# Patient Record
Sex: FEMALE | Race: WHITE | Employment: FULL TIME | URBAN - METROPOLITAN AREA
[De-identification: names, ages, dates, MRNs, and addresses within clinical notes are randomized per-mention and may not be internally consistent; named-entity substitution may affect disease eponyms.]

---

## 2021-02-21 ENCOUNTER — HOSPITAL ENCOUNTER (EMERGENCY)
Age: 26
Discharge: HOME | End: 2021-02-22
Attending: EMERGENCY MEDICINE | Admitting: EMERGENCY MEDICINE
Payer: COMMERCIAL

## 2021-02-21 ENCOUNTER — APPOINTMENT (OUTPATIENT)
Dept: CT IMAGING | Age: 26
End: 2021-02-21
Attending: PHYSICIAN ASSISTANT
Payer: COMMERCIAL

## 2021-02-21 DIAGNOSIS — T40.415A ADVERSE EFFECT OF FENTANYL, INITIAL ENCOUNTER: ICD-10-CM

## 2021-02-21 DIAGNOSIS — G93.89 BRAIN MASS: Primary | ICD-10-CM

## 2021-02-21 LAB
ALBUMIN SERPL-MCNC: 4.8 G/DL (ref 3.5–5)
ALBUMIN/GLOB SERPL: 1.4 {RATIO} (ref 1.1–2.2)
ALP SERPL-CCNC: 43 U/L (ref 45–117)
ALT SERPL-CCNC: 22 U/L (ref 12–78)
ANION GAP SERPL CALC-SCNC: 7 MMOL/L (ref 5–15)
APAP SERPL-MCNC: 3 UG/ML (ref 10–30)
APPEARANCE UR: CLEAR
AST SERPL-CCNC: 17 U/L (ref 15–37)
BACTERIA URNS QL MICRO: NEGATIVE /HPF
BASOPHILS # BLD: 0 K/UL (ref 0–0.1)
BASOPHILS NFR BLD: 0 % (ref 0–1)
BILIRUB SERPL-MCNC: 0.4 MG/DL (ref 0.2–1)
BILIRUB UR QL: NEGATIVE
BUN SERPL-MCNC: 13 MG/DL (ref 6–20)
BUN/CREAT SERPL: 16 (ref 12–20)
CALCIUM SERPL-MCNC: 9 MG/DL (ref 8.5–10.1)
CHLORIDE SERPL-SCNC: 107 MMOL/L (ref 97–108)
CO2 SERPL-SCNC: 25 MMOL/L (ref 21–32)
COLOR UR: NORMAL
CREAT SERPL-MCNC: 0.8 MG/DL (ref 0.55–1.02)
DIFFERENTIAL METHOD BLD: NORMAL
EOSINOPHIL # BLD: 0.1 K/UL (ref 0–0.4)
EOSINOPHIL NFR BLD: 1 % (ref 0–7)
EPITH CASTS URNS QL MICRO: NORMAL /LPF
ERYTHROCYTE [DISTWIDTH] IN BLOOD BY AUTOMATED COUNT: 13.1 % (ref 11.5–14.5)
ETHANOL SERPL-MCNC: <10 MG/DL
GLOBULIN SER CALC-MCNC: 3.4 G/DL (ref 2–4)
GLUCOSE SERPL-MCNC: 85 MG/DL (ref 65–100)
GLUCOSE UR STRIP.AUTO-MCNC: NEGATIVE MG/DL
HCG UR QL: NEGATIVE
HCT VFR BLD AUTO: 40.3 % (ref 35–47)
HGB BLD-MCNC: 13.5 G/DL (ref 11.5–16)
HGB UR QL STRIP: NEGATIVE
HYALINE CASTS URNS QL MICRO: NORMAL /LPF (ref 0–5)
IMM GRANULOCYTES # BLD AUTO: 0 K/UL (ref 0–0.04)
IMM GRANULOCYTES NFR BLD AUTO: 0 % (ref 0–0.5)
KETONES UR QL STRIP.AUTO: NEGATIVE MG/DL
LEUKOCYTE ESTERASE UR QL STRIP.AUTO: NEGATIVE
LYMPHOCYTES # BLD: 2.9 K/UL (ref 0.8–3.5)
LYMPHOCYTES NFR BLD: 31 % (ref 12–49)
MCH RBC QN AUTO: 29.5 PG (ref 26–34)
MCHC RBC AUTO-ENTMCNC: 33.5 G/DL (ref 30–36.5)
MCV RBC AUTO: 88.2 FL (ref 80–99)
MONOCYTES # BLD: 0.6 K/UL (ref 0–1)
MONOCYTES NFR BLD: 6 % (ref 5–13)
NEUTS SEG # BLD: 5.6 K/UL (ref 1.8–8)
NEUTS SEG NFR BLD: 62 % (ref 32–75)
NITRITE UR QL STRIP.AUTO: NEGATIVE
NRBC # BLD: 0 K/UL (ref 0–0.01)
NRBC BLD-RTO: 0 PER 100 WBC
PH UR STRIP: 6.5 [PH] (ref 5–8)
PLATELET # BLD AUTO: 255 K/UL (ref 150–400)
PMV BLD AUTO: 10.2 FL (ref 8.9–12.9)
POTASSIUM SERPL-SCNC: 3.7 MMOL/L (ref 3.5–5.1)
PROT SERPL-MCNC: 8.2 G/DL (ref 6.4–8.2)
PROT UR STRIP-MCNC: NEGATIVE MG/DL
RBC # BLD AUTO: 4.57 M/UL (ref 3.8–5.2)
RBC #/AREA URNS HPF: NORMAL /HPF (ref 0–5)
SALICYLATES SERPL-MCNC: 10.2 MG/DL (ref 2.8–20)
SODIUM SERPL-SCNC: 139 MMOL/L (ref 136–145)
SP GR UR REFRACTOMETRY: <1.005 (ref 1–1.03)
TROPONIN I SERPL-MCNC: <0.05 NG/ML
UR CULT HOLD, URHOLD: NORMAL
UROBILINOGEN UR QL STRIP.AUTO: 0.2 EU/DL (ref 0.2–1)
WBC # BLD AUTO: 9.1 K/UL (ref 3.6–11)
WBC URNS QL MICRO: NORMAL /HPF (ref 0–4)

## 2021-02-21 PROCEDURE — 80307 DRUG TEST PRSMV CHEM ANLYZR: CPT

## 2021-02-21 PROCEDURE — 81025 URINE PREGNANCY TEST: CPT

## 2021-02-21 PROCEDURE — 70450 CT HEAD/BRAIN W/O DYE: CPT

## 2021-02-21 PROCEDURE — 84484 ASSAY OF TROPONIN QUANT: CPT

## 2021-02-21 PROCEDURE — 36415 COLL VENOUS BLD VENIPUNCTURE: CPT

## 2021-02-21 PROCEDURE — 80179 DRUG ASSAY SALICYLATE: CPT

## 2021-02-21 PROCEDURE — 74011250636 HC RX REV CODE- 250/636: Performed by: PHYSICIAN ASSISTANT

## 2021-02-21 PROCEDURE — 85025 COMPLETE CBC W/AUTO DIFF WBC: CPT

## 2021-02-21 PROCEDURE — 80143 DRUG ASSAY ACETAMINOPHEN: CPT

## 2021-02-21 PROCEDURE — 80053 COMPREHEN METABOLIC PANEL: CPT

## 2021-02-21 PROCEDURE — 99285 EMERGENCY DEPT VISIT HI MDM: CPT

## 2021-02-21 PROCEDURE — 82077 ASSAY SPEC XCP UR&BREATH IA: CPT

## 2021-02-21 PROCEDURE — 81001 URINALYSIS AUTO W/SCOPE: CPT

## 2021-02-21 RX ADMIN — SODIUM CHLORIDE 1000 ML: 9 INJECTION, SOLUTION INTRAVENOUS at 23:23

## 2021-02-22 VITALS
HEIGHT: 64 IN | OXYGEN SATURATION: 99 % | RESPIRATION RATE: 14 BRPM | SYSTOLIC BLOOD PRESSURE: 101 MMHG | DIASTOLIC BLOOD PRESSURE: 60 MMHG | TEMPERATURE: 98 F | HEART RATE: 86 BPM

## 2021-02-22 LAB
AMPHET UR QL SCN: NEGATIVE
ATRIAL RATE: 78 BPM
BARBITURATES UR QL SCN: NEGATIVE
BENZODIAZ UR QL: NEGATIVE
CALCULATED P AXIS, ECG09: 84 DEGREES
CALCULATED R AXIS, ECG10: 117 DEGREES
CALCULATED T AXIS, ECG11: -2 DEGREES
CANNABINOIDS UR QL SCN: POSITIVE
COCAINE UR QL SCN: NEGATIVE
DIAGNOSIS, 93000: NORMAL
DRUG SCRN COMMENT,DRGCM: ABNORMAL
METHADONE UR QL: NEGATIVE
OPIATES UR QL: NEGATIVE
P-R INTERVAL, ECG05: 136 MS
PCP UR QL: NEGATIVE
Q-T INTERVAL, ECG07: 406 MS
QRS DURATION, ECG06: 82 MS
QTC CALCULATION (BEZET), ECG08: 462 MS
VENTRICULAR RATE, ECG03: 78 BPM

## 2021-02-22 PROCEDURE — 93005 ELECTROCARDIOGRAM TRACING: CPT

## 2021-02-22 NOTE — BSMART NOTE
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  
Diagnosis Date  Cigarette smoker  Migraine  Substance abuse ([facility] [address] The Medical Doctor to Psychiatrist conference was not completed. The Medical Doctor is in agreement with [facility] disposition because of (reason) pt does not meet inpt criteria and is not requesting hospitalization. The plan is discharge with outpatient referrals. The on-call Psychiatrist consulted was Dr. Bowen Michael. The admitting Psychiatrist will be Dr. Bowen Michael. The admitting Diagnosis is NA. The Payor source is medicaid. Section II - Integrated Summary Pt is a 32 y/o female transported to the ED via personal vehicle. She reports that she is \"not feeling like myself\" after an accidental fentanyl exposure. She reports she had been in possession of the \"bag\" since this past summer, but was trying to dispose of it by flushing down the toilet on  when \"it burst in my face. \" Pt reports that since then, she has been experiencing body aches, dehydration, confusion, psychomotor retardation. She reports that it is difficult for her to manage even the most simple daily tasks. Despite this report, upon assessment pt is A&Ox4. Presents with euthymic mood, constricted affect. Slow, monotone speech. Appears a bit withdrawn. Thought content is in conjunction with current situation. Thought process is linear and goal-directed. No report of SI/HI. Pt reports she has a history of addiction to opiates but has abstained from opioid use for the past 4 years. She does report ongoing/frequent marijuana use. Writer was asked to meet with pt to provide outpt resources. Pt reports she has been admitted at [facility] for outpt services as well as [address] [place] [address] for inpt rehab in 2016. She was enrolled in a sober living house in 2017. Pt has not had any treatment since 2017 but reports she is stable. She insists she was \"trying to do the right thing\" by disposing of the fentanyl to avoid relapsing, \"but what happened to me was like as if I was trying to do the wrong thing. \" Pt is frustrated at her current predicament and worried that the effect of the fentanyl will affect her performance at work. She has been  at [employer] for the past two months, she is proud of herself for holding this job despite it being quite stressful and despite the fact that she is still grieving the loss of her friend who  of an overdose in 2020. Pt reports she is amenable to restarting outpatient counseling.  Writer provided a list of local outpt therapists who accept pt's insurance. There are no grounds to pursue a TDO at this time. The patient has demonstrated mental capacity to provide informed consent. The information is given by the patient. The Chief Complaint is as noted above. The Precipitant Factors are as noted above. Previous Hospitalizations: no The patient has not previously been in restraints. Current Psychiatrist and/or  is none. Lethality Assessment: 
 
The potential for suicide is not noted. The potential for homicide is not noted. The patient has not been a perpetrator of sexual or physical abuse. There are not pending charges. The patient is not felt to be at risk for self harm or harm to others. Section III - Psychosocial 
The patient's overall mood and attitude is generally euthymic but appears a bit withdrawn. Feelings of helplessness and hopelessness are not observed. Generalized anxiety is not observed. Panic is not observed. Phobias are not observed. Obsessive compulsive tendencies are not observed. Section IV - Mental Status Exam 
The patient's appearance shows no evidence of impairment. The patient's behavior shows no evidence of impairment. The patient is oriented to time, place, person and situation. The patient's speech shows no evidence of impairment. The patient's mood is generally euthymic but appears a bit withdrawn. The range of affect is constricted. The patient's thought content demonstrates no evidence of impairment. The thought process shows no evidence of impairment. The patient's perception shows no evidence of impairment. The patient's memory shows no evidence of impairment. The patient's appetite shows no evidence of impairment. The patient's sleep shows no evidence of impairment. The patient's insight shows no evidence of impairment. The patient's judgement shows no evidence of impairment. Section V - Substance Abuse The patient is using substances. The patient is using cannabis by inhalation for 5-10 years with last use not specified. The patient has experienced the following withdrawal symptoms: N/A. Section VI - Living Arrangements The patient is single. The patient lives with a roommate. The patient has no children. The patient does plan to return home upon discharge. The patient does not have legal issues pending. The patient's source of income comes from employment. Evangelical and cultural practices have not been voiced at this time. The patient's greatest support comes from no one identified. The patient has not been in an event described as horrible or outside the realm of ordinary life experience either currently or in the past. 
The patient has not been a victim of sexual/physical abuse. Section VII - Other Areas of Clinical Concern The highest grade achieved is NA with the overall quality of school experience being described as NA. The patient is currently employed and speaks [place] as a primary language. The patient has no communication impairments affecting communication. The patient's preference for learning can be described as: can read and write adequately.   The patient's hearing is normal.  The patient's vision is normal. 
 
 
Felipe Booth MS

## 2021-02-22 NOTE — DISCHARGE INSTRUCTIONS
Return for new or worsening symptoms. Call Monday AM to make an appt with neurosurgery -tell them that you were seen in the emergency department at [facility], we spoke with Dr. Nathaniel Parnell and he wanted you to be seen.

## 2021-02-22 NOTE — ED NOTES
Pt had vasovagal episode during IV insertion, stated she is afraid of needles. Pt now resting in focused care pod, states she still feels dizzy and lightheaded. BP 88/58. IV fluid bolus started.

## 2021-02-22 NOTE — ED PROVIDER NOTES
31year old female presenting to the ED for multiple complaints. Pt notes that on Thursday night at about 7PM she had an exposure to Fentanyl. Notes that in 2016 she was clean from all substances for a year. Then started using marijuana, has been clean from opiates x 4 years. Then started using morphine and fentanyl recreationally. \"When it burst in my face, it messed with my brain. \"  Notes that she had come in possession of the bag over the summer, but was trying to dispose of it, states that she was trying to get rid of it in the toilet. Notes that she \"blacked out\" for 24 hours after that. States that she feels very dehydrated, has had muscle aches and body aches, \"my kidneys hurt,\" 'I feel like maybe my system got overloaded. \"  \"It's almost like period cramps. \"  Denies CP, SOB, vomiting, fever, vision changes, focal weakness/numbness. LMP 2/10. PMHx: migraines  Psx: wisdom teeth  Social: . + smoker. Sometimes occasional alcohol. \"I smoke a lot of weed every day. \"   See HPI for further information. Past Medical History:   Diagnosis Date    Cigarette smoker     Migraine     Substance abuse ([facility] [address]        Past Surgical History:   Procedure Laterality Date    HX WISDOM TEETH EXTRACTION           History reviewed. No pertinent family history. Social History     Socioeconomic History    Marital status: SINGLE     Spouse name: Not on file    Number of children: Not on file    Years of education: Not on file    Highest education level: Not on file   Occupational History    Not on file   Social Needs    Financial resource strain: Not on file    Food insecurity     Worry: Not on file     Inability: Not on file    Transportation needs     Medical: Not on file     Non-medical: Not on file   Tobacco Use    Smoking status: Current Every Day Smoker    Smokeless tobacco: Current User   Substance and Sexual Activity    Alcohol use: Not Currently    Drug use:  Yes    Sexual activity: Not on file   Lifestyle    Physical activity     Days per week: Not on file     Minutes per session: Not on file    Stress: Not on file   Relationships    Social connections     Talks on phone: Not on file     Gets together: Not on file     Attends Catholic service: Not on file     Active member of club or organization: Not on file     Attends meetings of clubs or organizations: Not on file     Relationship status: Not on file    Intimate partner violence     Fear of current or ex partner: Not on file     Emotionally abused: Not on file     Physically abused: Not on file     Forced sexual activity: Not on file   Other Topics Concern    Not on file   Social History Narrative    Not on file         ALLERGIES: Erythromycin and Pcn [penicillins]    Review of Systems   Constitutional: Negative for fever. HENT: Negative for facial swelling. Eyes: Negative for visual disturbance. Respiratory: Negative for shortness of breath. Cardiovascular: Negative for chest pain. Gastrointestinal: Positive for abdominal distention. Negative for vomiting. Musculoskeletal: Positive for myalgias. Skin: Negative for wound. Neurological: Negative for headaches. All other systems reviewed and are negative. Vitals:    02/21/21 2221 02/21/21 2317   BP: 117/83 (!) 88/58   Pulse: 77 96   Resp: 18 16   Temp: 97.9 °F (36.6 °C)    SpO2: 99%    Height: 5' 4\" (1.626 m)             Physical Exam  Vitals signs and nursing note reviewed. Constitutional:       General: She is not in acute distress. Appearance: She is well-developed. Comments: Pleasant WF   HENT:      Right Ear: External ear normal.      Left Ear: External ear normal.   Eyes:      Extraocular Movements: Extraocular movements intact. Pupils: Pupils are equal, round, and reactive to light. Neck:      Musculoskeletal: Normal range of motion and neck supple. Cardiovascular:      Rate and Rhythm: Normal rate and regular rhythm. Heart sounds: Normal heart sounds. No murmur. No friction rub. No gallop. Pulmonary:      Effort: Pulmonary effort is normal. No respiratory distress. Breath sounds: Normal breath sounds. No wheezing. Abdominal:      Palpations: Abdomen is soft. Tenderness: There is no abdominal tenderness. There is no guarding. Comments: Soft, benign   Musculoskeletal: Normal range of motion. Skin:     General: Skin is warm and dry. Neurological:      General: No focal deficit present. Mental Status: She is alert and oriented to person, place, and time. Cranial Nerves: No cranial nerve deficit (2 through 12 tested and intact). Motor: No weakness (Normal  strength). Gait: Gait (Normal gait observed) normal.          MDM  Number of Diagnoses or Management Options  Adverse effect of fentanyl, initial encounter  Brain mass  Diagnosis management comments: 29-year-old female presenting to the ED for a plethora of complaints after an apparent accidental exposure to fentanyl about 48 hours ago. States that a bag of fentanyl exploded in her face when she was trying to dispose of it, then subsequently had a 24-hour period where she does not remember anything, also notes body aches, back pain, states that she feels dehydrated. No fever, cough, congestion, other signs or symptoms of acute illness. Also reports decreased sensation in all of her fingertips but states \"it might be because of my calluses.)  Given symptoms, 24-hour. During which patient had no recollection, numbness of the hands, labs, EKG, troponin, CT of the head ordered. CT showing a small brain mass, likely meningioma. Discussed with neurosurgery on-call, agrees that symptoms patient has been complaining of her likely unrelated, will see patient in follow-up.        Amount and/or Complexity of Data Reviewed  Clinical lab tests: ordered and reviewed  Tests in the radiology section of CPT®: reviewed and ordered  Discuss the patient with other providers: yes (Dr. Almza Hook, ED attending. Dr. Odessa Waller, neurosurgery)           Procedures          Discussed with Dr. Odessa Waller, neurosurgery. Discussed case, exam findings, imaging. Recommends outpatient follow up. KRISTY Mota  12:30 AM           ED EKG interpretation:  Rhythm: normal sinus rhythm; and regular . Rate (approx.): 78; Axis: normal; ST/T wave: non-specific changes;  No STEMI